# Patient Record
Sex: FEMALE | Race: BLACK OR AFRICAN AMERICAN | NOT HISPANIC OR LATINO | Employment: UNEMPLOYED | URBAN - METROPOLITAN AREA
[De-identification: names, ages, dates, MRNs, and addresses within clinical notes are randomized per-mention and may not be internally consistent; named-entity substitution may affect disease eponyms.]

---

## 2023-09-27 ENCOUNTER — APPOINTMENT (OUTPATIENT)
Dept: CT IMAGING | Facility: HOSPITAL | Age: 53
End: 2023-09-27
Payer: MEDICARE

## 2023-09-27 ENCOUNTER — HOSPITAL ENCOUNTER (EMERGENCY)
Facility: HOSPITAL | Age: 53
Discharge: HOME | End: 2023-09-28
Attending: EMERGENCY MEDICINE
Payer: MEDICARE

## 2023-09-27 ENCOUNTER — HOSPITAL ENCOUNTER (EMERGENCY)
Facility: HOSPITAL | Age: 53
Discharge: HOME | End: 2023-09-27
Attending: EMERGENCY MEDICINE | Admitting: EMERGENCY MEDICINE
Payer: COMMERCIAL

## 2023-09-27 ENCOUNTER — APPOINTMENT (OUTPATIENT)
Dept: GENERAL RADIOLOGY | Facility: HOSPITAL | Age: 53
End: 2023-09-27
Payer: MEDICARE

## 2023-09-27 VITALS
WEIGHT: 185.41 LBS | HEART RATE: 119 BPM | OXYGEN SATURATION: 100 % | RESPIRATION RATE: 31 BRPM | TEMPERATURE: 98.6 F | HEIGHT: 65 IN | BODY MASS INDEX: 30.89 KG/M2 | DIASTOLIC BLOOD PRESSURE: 103 MMHG | SYSTOLIC BLOOD PRESSURE: 169 MMHG

## 2023-09-27 VITALS
WEIGHT: 180.34 LBS | RESPIRATION RATE: 16 BRPM | TEMPERATURE: 98.2 F | OXYGEN SATURATION: 98 % | BODY MASS INDEX: 25.25 KG/M2 | SYSTOLIC BLOOD PRESSURE: 157 MMHG | DIASTOLIC BLOOD PRESSURE: 100 MMHG | HEIGHT: 71 IN | HEART RATE: 118 BPM

## 2023-09-27 DIAGNOSIS — Z00.8 MEDICAL CLEARANCE FOR PSYCHIATRIC ADMISSION: Primary | ICD-10-CM

## 2023-09-27 DIAGNOSIS — R41.0 DISORIENTATION: ICD-10-CM

## 2023-09-27 DIAGNOSIS — S09.90XA INJURY OF HEAD, INITIAL ENCOUNTER: Primary | ICD-10-CM

## 2023-09-27 LAB
ALBUMIN SERPL-MCNC: 4.6 G/DL (ref 3.5–5.2)
ALBUMIN SERPL-MCNC: 5 G/DL (ref 3.5–5.2)
ALBUMIN/GLOB SERPL: 1.5 G/DL
ALBUMIN/GLOB SERPL: 1.5 G/DL
ALP SERPL-CCNC: 105 U/L (ref 39–117)
ALP SERPL-CCNC: 95 U/L (ref 39–117)
ALT SERPL W P-5'-P-CCNC: 11 U/L (ref 1–33)
ALT SERPL W P-5'-P-CCNC: 14 U/L (ref 1–33)
AMPHET+METHAMPHET UR QL: NEGATIVE
ANION GAP SERPL CALCULATED.3IONS-SCNC: 13.2 MMOL/L (ref 5–15)
ANION GAP SERPL CALCULATED.3IONS-SCNC: 16.1 MMOL/L (ref 5–15)
APAP SERPL-MCNC: <5 MCG/ML (ref 0–30)
AST SERPL-CCNC: 18 U/L (ref 1–32)
AST SERPL-CCNC: 26 U/L (ref 1–32)
BARBITURATES UR QL SCN: NEGATIVE
BASOPHILS # BLD AUTO: 0.04 10*3/MM3 (ref 0–0.2)
BASOPHILS # BLD AUTO: 0.04 10*3/MM3 (ref 0–0.2)
BASOPHILS NFR BLD AUTO: 0.3 % (ref 0–1.5)
BASOPHILS NFR BLD AUTO: 0.4 % (ref 0–1.5)
BENZODIAZ UR QL SCN: NEGATIVE
BILIRUB SERPL-MCNC: 1.1 MG/DL (ref 0–1.2)
BILIRUB SERPL-MCNC: 1.1 MG/DL (ref 0–1.2)
BILIRUB UR QL STRIP: NEGATIVE
BUN SERPL-MCNC: 10 MG/DL (ref 6–20)
BUN SERPL-MCNC: 8 MG/DL (ref 6–20)
BUN/CREAT SERPL: 7.8 (ref 7–25)
BUN/CREAT SERPL: 9.3 (ref 7–25)
CALCIUM SPEC-SCNC: 10 MG/DL (ref 8.6–10.5)
CALCIUM SPEC-SCNC: 10.4 MG/DL (ref 8.6–10.5)
CANNABINOIDS SERPL QL: NEGATIVE
CHLORIDE SERPL-SCNC: 100 MMOL/L (ref 98–107)
CHLORIDE SERPL-SCNC: 101 MMOL/L (ref 98–107)
CLARITY UR: CLEAR
CO2 SERPL-SCNC: 20.9 MMOL/L (ref 22–29)
CO2 SERPL-SCNC: 23.8 MMOL/L (ref 22–29)
COCAINE UR QL: NEGATIVE
COLOR UR: YELLOW
CREAT SERPL-MCNC: 1.03 MG/DL (ref 0.57–1)
CREAT SERPL-MCNC: 1.07 MG/DL (ref 0.57–1)
DEPRECATED RDW RBC AUTO: 46.6 FL (ref 37–54)
DEPRECATED RDW RBC AUTO: 48.7 FL (ref 37–54)
EGFRCR SERPLBLD CKD-EPI 2021: 61.9 ML/MIN/1.73
EGFRCR SERPLBLD CKD-EPI 2021: 64.7 ML/MIN/1.73
EOSINOPHIL # BLD AUTO: 0.02 10*3/MM3 (ref 0–0.4)
EOSINOPHIL # BLD AUTO: 0.02 10*3/MM3 (ref 0–0.4)
EOSINOPHIL NFR BLD AUTO: 0.2 % (ref 0.3–6.2)
EOSINOPHIL NFR BLD AUTO: 0.2 % (ref 0.3–6.2)
ERYTHROCYTE [DISTWIDTH] IN BLOOD BY AUTOMATED COUNT: 14.9 % (ref 12.3–15.4)
ERYTHROCYTE [DISTWIDTH] IN BLOOD BY AUTOMATED COUNT: 15 % (ref 12.3–15.4)
ETHANOL BLD-MCNC: <10 MG/DL (ref 0–10)
ETHANOL UR QL: <0.01 %
FENTANYL UR-MCNC: NEGATIVE NG/ML
GLOBULIN UR ELPH-MCNC: 3.1 GM/DL
GLOBULIN UR ELPH-MCNC: 3.3 GM/DL
GLUCOSE SERPL-MCNC: 102 MG/DL (ref 65–99)
GLUCOSE SERPL-MCNC: 103 MG/DL (ref 65–99)
GLUCOSE UR STRIP-MCNC: NEGATIVE MG/DL
HCG INTACT+B SERPL-ACNC: <0.5 MIU/ML
HCT VFR BLD AUTO: 38.1 % (ref 34–46.6)
HCT VFR BLD AUTO: 42.4 % (ref 34–46.6)
HGB BLD-MCNC: 12.1 G/DL (ref 12–15.9)
HGB BLD-MCNC: 13.2 G/DL (ref 12–15.9)
HGB UR QL STRIP.AUTO: NEGATIVE
HOLD SPECIMEN: NORMAL
IMM GRANULOCYTES # BLD AUTO: 0.07 10*3/MM3 (ref 0–0.05)
IMM GRANULOCYTES # BLD AUTO: 0.08 10*3/MM3 (ref 0–0.05)
IMM GRANULOCYTES NFR BLD AUTO: 0.6 % (ref 0–0.5)
IMM GRANULOCYTES NFR BLD AUTO: 0.7 % (ref 0–0.5)
KETONES UR QL STRIP: NEGATIVE
LEUKOCYTE ESTERASE UR QL STRIP.AUTO: NEGATIVE
LIPASE SERPL-CCNC: 16 U/L (ref 13–60)
LYMPHOCYTES # BLD AUTO: 1.87 10*3/MM3 (ref 0.7–3.1)
LYMPHOCYTES # BLD AUTO: 2.01 10*3/MM3 (ref 0.7–3.1)
LYMPHOCYTES NFR BLD AUTO: 15.7 % (ref 19.6–45.3)
LYMPHOCYTES NFR BLD AUTO: 17.5 % (ref 19.6–45.3)
MAGNESIUM SERPL-MCNC: 2.3 MG/DL (ref 1.6–2.6)
MCH RBC QN AUTO: 27.3 PG (ref 26.6–33)
MCH RBC QN AUTO: 27.6 PG (ref 26.6–33)
MCHC RBC AUTO-ENTMCNC: 31.1 G/DL (ref 31.5–35.7)
MCHC RBC AUTO-ENTMCNC: 31.8 G/DL (ref 31.5–35.7)
MCV RBC AUTO: 85.8 FL (ref 79–97)
MCV RBC AUTO: 88.7 FL (ref 79–97)
METHADONE UR QL SCN: NEGATIVE
MONOCYTES # BLD AUTO: 0.79 10*3/MM3 (ref 0.1–0.9)
MONOCYTES # BLD AUTO: 0.83 10*3/MM3 (ref 0.1–0.9)
MONOCYTES NFR BLD AUTO: 6.2 % (ref 5–12)
MONOCYTES NFR BLD AUTO: 7.8 % (ref 5–12)
NEUTROPHILS NFR BLD AUTO: 7.84 10*3/MM3 (ref 1.7–7)
NEUTROPHILS NFR BLD AUTO: 73.4 % (ref 42.7–76)
NEUTROPHILS NFR BLD AUTO: 77 % (ref 42.7–76)
NEUTROPHILS NFR BLD AUTO: 9.87 10*3/MM3 (ref 1.7–7)
NITRITE UR QL STRIP: NEGATIVE
NRBC BLD AUTO-RTO: 0 /100 WBC (ref 0–0.2)
NRBC BLD AUTO-RTO: 0 /100 WBC (ref 0–0.2)
NT-PROBNP SERPL-MCNC: 135.4 PG/ML (ref 0–900)
OPIATES UR QL: NEGATIVE
OXYCODONE UR QL SCN: NEGATIVE
PH UR STRIP.AUTO: 6 [PH] (ref 5–8)
PLATELET # BLD AUTO: 429 10*3/MM3 (ref 140–450)
PLATELET # BLD AUTO: 441 10*3/MM3 (ref 140–450)
PMV BLD AUTO: 9.7 FL (ref 6–12)
PMV BLD AUTO: 9.7 FL (ref 6–12)
POTASSIUM SERPL-SCNC: 3.7 MMOL/L (ref 3.5–5.2)
POTASSIUM SERPL-SCNC: 3.9 MMOL/L (ref 3.5–5.2)
PROT SERPL-MCNC: 7.7 G/DL (ref 6–8.5)
PROT SERPL-MCNC: 8.3 G/DL (ref 6–8.5)
PROT UR QL STRIP: NEGATIVE
RBC # BLD AUTO: 4.44 10*6/MM3 (ref 3.77–5.28)
RBC # BLD AUTO: 4.78 10*6/MM3 (ref 3.77–5.28)
SALICYLATES SERPL-MCNC: <0.3 MG/DL
SODIUM SERPL-SCNC: 137 MMOL/L (ref 136–145)
SODIUM SERPL-SCNC: 138 MMOL/L (ref 136–145)
SP GR UR STRIP: 1.01 (ref 1–1.03)
TROPONIN T SERPL HS-MCNC: 9 NG/L
UROBILINOGEN UR QL STRIP: NORMAL
WBC NRBC COR # BLD: 10.67 10*3/MM3 (ref 3.4–10.8)
WBC NRBC COR # BLD: 12.81 10*3/MM3 (ref 3.4–10.8)
WHOLE BLOOD HOLD COAG: NORMAL
WHOLE BLOOD HOLD COAG: NORMAL
WHOLE BLOOD HOLD SPECIMEN: NORMAL
WHOLE BLOOD HOLD SPECIMEN: NORMAL

## 2023-09-27 PROCEDURE — 80307 DRUG TEST PRSMV CHEM ANLYZR: CPT | Performed by: EMERGENCY MEDICINE

## 2023-09-27 PROCEDURE — 99284 EMERGENCY DEPT VISIT MOD MDM: CPT

## 2023-09-27 PROCEDURE — 84702 CHORIONIC GONADOTROPIN TEST: CPT | Performed by: EMERGENCY MEDICINE

## 2023-09-27 PROCEDURE — 80053 COMPREHEN METABOLIC PANEL: CPT | Performed by: EMERGENCY MEDICINE

## 2023-09-27 PROCEDURE — 85025 COMPLETE CBC W/AUTO DIFF WBC: CPT | Performed by: EMERGENCY MEDICINE

## 2023-09-27 PROCEDURE — 83880 ASSAY OF NATRIURETIC PEPTIDE: CPT | Performed by: EMERGENCY MEDICINE

## 2023-09-27 PROCEDURE — 84484 ASSAY OF TROPONIN QUANT: CPT | Performed by: EMERGENCY MEDICINE

## 2023-09-27 PROCEDURE — 36415 COLL VENOUS BLD VENIPUNCTURE: CPT

## 2023-09-27 PROCEDURE — 93005 ELECTROCARDIOGRAM TRACING: CPT

## 2023-09-27 PROCEDURE — 70450 CT HEAD/BRAIN W/O DYE: CPT

## 2023-09-27 PROCEDURE — 80143 DRUG ASSAY ACETAMINOPHEN: CPT | Performed by: EMERGENCY MEDICINE

## 2023-09-27 PROCEDURE — 93005 ELECTROCARDIOGRAM TRACING: CPT | Performed by: EMERGENCY MEDICINE

## 2023-09-27 PROCEDURE — 80179 DRUG ASSAY SALICYLATE: CPT | Performed by: EMERGENCY MEDICINE

## 2023-09-27 PROCEDURE — 71045 X-RAY EXAM CHEST 1 VIEW: CPT

## 2023-09-27 PROCEDURE — 83690 ASSAY OF LIPASE: CPT | Performed by: EMERGENCY MEDICINE

## 2023-09-27 PROCEDURE — 81003 URINALYSIS AUTO W/O SCOPE: CPT | Performed by: EMERGENCY MEDICINE

## 2023-09-27 PROCEDURE — 93010 ELECTROCARDIOGRAM REPORT: CPT | Performed by: INTERNAL MEDICINE

## 2023-09-27 PROCEDURE — 82077 ASSAY SPEC XCP UR&BREATH IA: CPT | Performed by: EMERGENCY MEDICINE

## 2023-09-27 PROCEDURE — 83735 ASSAY OF MAGNESIUM: CPT | Performed by: EMERGENCY MEDICINE

## 2023-09-27 RX ORDER — CLONAZEPAM 1 MG/1
1 TABLET ORAL DAILY
Status: ON HOLD | COMMUNITY
End: 2023-09-28

## 2023-09-27 RX ORDER — SODIUM CHLORIDE 0.9 % (FLUSH) 0.9 %
10 SYRINGE (ML) INJECTION AS NEEDED
Status: DISCONTINUED | OUTPATIENT
Start: 2023-09-27 | End: 2023-09-27 | Stop reason: HOSPADM

## 2023-09-27 RX ORDER — RISPERIDONE 1 MG/1
1 TABLET ORAL DAILY
Status: ON HOLD | COMMUNITY
End: 2023-09-28

## 2023-09-27 RX ORDER — ASPIRIN 81 MG/1
324 TABLET, CHEWABLE ORAL ONCE
Status: DISCONTINUED | OUTPATIENT
Start: 2023-09-27 | End: 2023-09-27 | Stop reason: HOSPADM

## 2023-09-27 NOTE — ED NOTES
Pt unable to answer any questions at this time. Pt is lethargic, will be here for medical clearance. Will ask Pt triage questions at a later time.
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
## 2023-09-28 ENCOUNTER — HOSPITAL ENCOUNTER (INPATIENT)
Facility: HOSPITAL | Age: 53
LOS: 1 days | Discharge: HOME | DRG: 885 | End: 2023-09-29
Attending: PSYCHIATRY & NEUROLOGY | Admitting: PSYCHIATRY & NEUROLOGY
Payer: MEDICARE

## 2023-09-28 PROBLEM — F29 PSYCHOSIS: Status: ACTIVE | Noted: 2023-09-28

## 2023-09-28 PROBLEM — F39 UNSPECIFIED MOOD (AFFECTIVE) DISORDER: Status: ACTIVE | Noted: 2023-09-28

## 2023-09-28 LAB
QT INTERVAL: 349 MS
QTC INTERVAL: 438 MS

## 2023-09-28 RX ORDER — DIPHENHYDRAMINE HCL 50 MG
50 CAPSULE ORAL EVERY 4 HOURS PRN
Status: DISCONTINUED | OUTPATIENT
Start: 2023-09-28 | End: 2023-09-30 | Stop reason: HOSPADM

## 2023-09-28 RX ORDER — ALUMINA, MAGNESIA, AND SIMETHICONE 2400; 2400; 240 MG/30ML; MG/30ML; MG/30ML
15 SUSPENSION ORAL EVERY 6 HOURS PRN
Status: DISCONTINUED | OUTPATIENT
Start: 2023-09-28 | End: 2023-09-30 | Stop reason: HOSPADM

## 2023-09-28 RX ORDER — LORAZEPAM 2 MG/1
2 TABLET ORAL EVERY 4 HOURS PRN
Status: DISCONTINUED | OUTPATIENT
Start: 2023-09-28 | End: 2023-09-30 | Stop reason: HOSPADM

## 2023-09-28 RX ORDER — HYDROXYZINE PAMOATE 50 MG/1
50 CAPSULE ORAL EVERY 6 HOURS PRN
Status: DISCONTINUED | OUTPATIENT
Start: 2023-09-28 | End: 2023-09-30 | Stop reason: HOSPADM

## 2023-09-28 RX ORDER — LORAZEPAM 2 MG/ML
2 INJECTION INTRAMUSCULAR EVERY 4 HOURS PRN
Status: DISCONTINUED | OUTPATIENT
Start: 2023-09-28 | End: 2023-09-30 | Stop reason: HOSPADM

## 2023-09-28 RX ORDER — HALOPERIDOL 5 MG/ML
5 INJECTION INTRAMUSCULAR EVERY 4 HOURS PRN
Status: DISCONTINUED | OUTPATIENT
Start: 2023-09-28 | End: 2023-09-30 | Stop reason: HOSPADM

## 2023-09-28 RX ORDER — METOPROLOL TARTRATE 50 MG/1
50 TABLET, FILM COATED ORAL ONCE
Status: COMPLETED | OUTPATIENT
Start: 2023-09-28 | End: 2023-09-28

## 2023-09-28 RX ORDER — ACETAMINOPHEN 325 MG/1
650 TABLET ORAL EVERY 4 HOURS PRN
Status: DISCONTINUED | OUTPATIENT
Start: 2023-09-28 | End: 2023-09-30 | Stop reason: HOSPADM

## 2023-09-28 RX ORDER — NICOTINE 21 MG/24HR
1 PATCH, TRANSDERMAL 24 HOURS TRANSDERMAL DAILY PRN
Status: DISCONTINUED | OUTPATIENT
Start: 2023-09-28 | End: 2023-09-30 | Stop reason: HOSPADM

## 2023-09-28 RX ORDER — DIPHENHYDRAMINE HYDROCHLORIDE 50 MG/ML
50 INJECTION INTRAMUSCULAR; INTRAVENOUS EVERY 4 HOURS PRN
Status: DISCONTINUED | OUTPATIENT
Start: 2023-09-28 | End: 2023-09-30 | Stop reason: HOSPADM

## 2023-09-28 RX ORDER — RISPERIDONE 2 MG/1
2 TABLET ORAL 2 TIMES DAILY
Status: DISCONTINUED | OUTPATIENT
Start: 2023-09-28 | End: 2023-09-29

## 2023-09-28 RX ORDER — TRAZODONE HYDROCHLORIDE 50 MG/1
100 TABLET ORAL NIGHTLY PRN
Status: DISCONTINUED | OUTPATIENT
Start: 2023-09-28 | End: 2023-09-30 | Stop reason: HOSPADM

## 2023-09-28 RX ORDER — LOPERAMIDE HYDROCHLORIDE 2 MG/1
2 CAPSULE ORAL
Status: DISCONTINUED | OUTPATIENT
Start: 2023-09-28 | End: 2023-09-30 | Stop reason: HOSPADM

## 2023-09-28 RX ORDER — HALOPERIDOL 5 MG/1
5 TABLET ORAL EVERY 4 HOURS PRN
Status: DISCONTINUED | OUTPATIENT
Start: 2023-09-28 | End: 2023-09-30 | Stop reason: HOSPADM

## 2023-09-28 RX ADMIN — RISPERIDONE 2 MG: 2 TABLET, FILM COATED ORAL at 11:57

## 2023-09-28 RX ADMIN — METOPROLOL TARTRATE 50 MG: 50 TABLET, FILM COATED ORAL at 09:21

## 2023-09-28 RX ADMIN — RISPERIDONE 2 MG: 2 TABLET, FILM COATED ORAL at 20:55

## 2023-09-28 NOTE — PLAN OF CARE
Goal Outcome Evaluation:  Plan of Care Reviewed With: patient  Patient Agreement with Plan of Care: agrees with comment (describe) (Pt is highly confused, understanding of information is questioned.)   See admit note
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
## 2023-09-28 NOTE — PLAN OF CARE
Goal Outcome Evaluation:  Plan of Care Reviewed With: patient  Patient Agreement with Plan of Care: disagrees (describe) (pt requesting discharge, med compliant with encouragement)            Pt has been withdrawn to room resting. She has exhibited some paranoid thinking regarding medication, but has been med compliant. She is easy to startle.  She denies SI/HI, AVH and contracts for safety.  She is somewhat confused, and oriented to self, and speaks about trying to get back to work. She continually asks to leave.  Will continue to monitor.
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
## 2023-09-29 VITALS
SYSTOLIC BLOOD PRESSURE: 145 MMHG | RESPIRATION RATE: 18 BRPM | DIASTOLIC BLOOD PRESSURE: 90 MMHG | HEIGHT: 66 IN | TEMPERATURE: 98.2 F | OXYGEN SATURATION: 100 % | WEIGHT: 182.98 LBS | HEART RATE: 69 BPM | BODY MASS INDEX: 29.41 KG/M2

## 2023-09-29 PROBLEM — I10 PRIMARY HYPERTENSION: Status: ACTIVE | Noted: 2023-09-29

## 2023-09-29 LAB
ALBUMIN SERPL-MCNC: 4.8 G/DL (ref 3.5–5.2)
ALBUMIN/GLOB SERPL: 1.6 G/DL
ALP SERPL-CCNC: 109 U/L (ref 39–117)
ALT SERPL W P-5'-P-CCNC: 17 U/L (ref 1–33)
ANION GAP SERPL CALCULATED.3IONS-SCNC: 11.5 MMOL/L (ref 5–15)
AST SERPL-CCNC: 25 U/L (ref 1–32)
BILIRUB SERPL-MCNC: 0.7 MG/DL (ref 0–1.2)
BUN SERPL-MCNC: 7 MG/DL (ref 6–20)
BUN/CREAT SERPL: 6.8 (ref 7–25)
CALCIUM SPEC-SCNC: 10.1 MG/DL (ref 8.6–10.5)
CHLORIDE SERPL-SCNC: 99 MMOL/L (ref 98–107)
CO2 SERPL-SCNC: 27.5 MMOL/L (ref 22–29)
CREAT SERPL-MCNC: 1.03 MG/DL (ref 0.57–1)
EGFRCR SERPLBLD CKD-EPI 2021: 64.7 ML/MIN/1.73
GLOBULIN UR ELPH-MCNC: 3 GM/DL
GLUCOSE SERPL-MCNC: 101 MG/DL (ref 65–99)
POTASSIUM SERPL-SCNC: 4 MMOL/L (ref 3.5–5.2)
PROT SERPL-MCNC: 7.8 G/DL (ref 6–8.5)
SODIUM SERPL-SCNC: 138 MMOL/L (ref 136–145)

## 2023-09-29 PROCEDURE — 80053 COMPREHEN METABOLIC PANEL: CPT | Performed by: PSYCHIATRY & NEUROLOGY

## 2023-09-29 RX ORDER — RISPERIDONE 3 MG/1
3 TABLET ORAL 2 TIMES DAILY
Status: DISCONTINUED | OUTPATIENT
Start: 2023-09-29 | End: 2023-09-30 | Stop reason: HOSPADM

## 2023-09-29 RX ORDER — METOPROLOL SUCCINATE 25 MG/1
25 TABLET, EXTENDED RELEASE ORAL
Qty: 30 TABLET | Refills: 0 | Status: SHIPPED | OUTPATIENT
Start: 2023-09-30

## 2023-09-29 RX ORDER — RISPERIDONE 3 MG/1
3 TABLET ORAL 2 TIMES DAILY
Qty: 60 TABLET | Refills: 1 | Status: SHIPPED | OUTPATIENT
Start: 2023-09-29

## 2023-09-29 RX ORDER — METOPROLOL SUCCINATE 25 MG/1
25 TABLET, EXTENDED RELEASE ORAL
Status: DISCONTINUED | OUTPATIENT
Start: 2023-09-29 | End: 2023-09-30 | Stop reason: HOSPADM

## 2023-09-29 RX ADMIN — RISPERIDONE 3 MG: 3 TABLET ORAL at 20:22

## 2023-09-29 RX ADMIN — RISPERIDONE 2 MG: 2 TABLET, FILM COATED ORAL at 08:42

## 2023-09-29 RX ADMIN — METOPROLOL SUCCINATE 25 MG: 25 TABLET, EXTENDED RELEASE ORAL at 11:20

## 2023-09-29 RX ADMIN — ACETAMINOPHEN 650 MG: 325 TABLET ORAL at 03:59

## 2023-09-29 NOTE — PROGRESS NOTES
[facility]     Psychiatric Progress Note    Patient Name: José Luis Burch  : 1970  MRN: [id number]  Primary Care Physician:  Provider, No Known  Date of admission: 2023    Subjective   Subjective     Patient seen and chart reviewed, discussed with staff.    Chief Complaint:       HPI:     Staff reports that she has had significant elevated blood pressures.  Rated anxiety as a 5 and depression as a 7.  Staff did talk to a friend of hers as well as her father and provided some history.  She has a previous history of psychiatric disorders but they are unsure of potential diagnosis.  He reported his behavior is out of the ordinary for her.  Patient appears to be less confused to staff.    Patient today is cooperative engaging.  Patient reports that she slept okay last night.  She reports her mood is okay.  Patient continues to ask about going home.  Patient continues to be somewhat disorganized and unable to provide history of how she got here.  She is tangential and she is also attempting to cover up for cognitive issues.  She is somewhat rambling at times.  Her speech may be slightly pushed but it is not pressured.  She has no acute agitation or restlessness.    Spent a long time with the patient asking about symptoms of bipolar disorder.  She reports having periods of little sleep with increased energy, but denies any excessive spending.  Denies any periods of impulsivity.  She denies impulsivity but family reports that she had a hospitalization of 6 to 7 days in [place] recently and that was very similar to this 1.  She apparently left [place] for unknown reason and was found disorganized and bizarre in another state.  Patient cannot tell me why she would have left [place] to come to [facility].  She is fully oriented and knows the day, month, year, place, city, and state and urine.  Patient cannot seem to keep her mind we have talked with family and that they are going to come pick  "her up.  She keeps talking by getting money orders and writing cars.    She is denying suicidal ideations but her recent behavior as well as her current thought disorganization reveal a degree of dangerousness that warrants continued mood stabilization.  Patient cannot safely navigate bus system to get back home.  Also would not trust her driving her car and getting back home safely.  She may well intend to do that but her tangentiality and thought disorganization would interfere.    Patient has support of friends and father are willing to come pick her up.  We will need to work out the logistics of this.  However she needs further stabilization prior to discharge.      Objective   Objective     Vitals:   Temp:  [98.3 °F (36.8 °C)] 98.3 °F (36.8 °C)  Heart Rate:  [115-119] 118  Resp:  [16-18] 16  BP: (136-156)/() 151/104    Increase blood pressure and will add metoprolol.      Mental Status Exam:      Appearance:   Well-developed, well-nourished, participates in interview, has some anxiety  Reliability:   Limited  Eye Contact:   Limited  Concentration/Focus:    Easily distracted and derailed  Behaviors:    No agitation  Memory :    Sensorium grossly intact, but unable to provide details of how she got here and has deficits  Speech:    Rambling, normal rate and volume  Language:   Appropriate, relevant  Mood :    \"It is okay\"  Affect:    Blunted  Thought process:    Tangential, disorganized, perseverative at times  Thought Content:    Denies suicidal or homicidal ideation, denies hallucinations and none evident but is inattentive and may be responding to internal stimuli that is not obvious  Insight:   Poor  Judgement:    Impaired      Result Review    Result Review:  I have personally reviewed the results from the time of this admission to 9/29/2023 12:31 EDT and agree with these findings:  []  Laboratory  []  Microbiology  []  Radiology  []  EKG/Telemetry   []  Cardiology/Vascular   []  Pathology  []  Old " records  []  Other:  Most notable findings include:     Lab Results (last 24 hours)       ** No results found for the last 24 hours. **                Medications:   metoprolol succinate XL, 25 mg, Oral, Q24H  risperiDONE, 3 mg, Oral, BID          Assessment / Plan       Active Hospital Problems:  Active Hospital Problems    Diagnosis     Primary hypertension     Psychosis        Plan:     Increase Risperdal to 3 mg twice daily  Considered adding lithium but she has an increased creatinine and GFR is within reference range but on the low end.  Will repeat BMP in the morning to see if it is improved and if lithium may be a choice for her  Add metoprolol for hypertension, consider hospitalist consult if not better on metoprolol  Work on mood stabilization and abatement of any suicidal ideation or psychosis.  Work on appropriate safety plan  Continue supportive therapy  Patient to engage in all group and individual treatment modalities available on the unit  Obtain collateral information if possible  Titrate medications as clinically indicated  Work on appropriate disposition follow-up including referrals to substance abuse treatment if indicated      Disposition:  I expect patient to be discharged to 3 days with family.    Part of this note may be an electronic transcription/translation of spoken language to printed text using the Dragon dictation system.         Electronically signed by Gabe Mcclure MD, 09/29/23, 12:31 PM EDT.

## 2023-09-29 NOTE — PLAN OF CARE
Goal Outcome Evaluation:  Plan of Care Reviewed With: patient  Patient Agreement with Plan of Care: agrees   Pt unable to rate anxiety and depression. Pt denied AVH, SI, HI. Pt sedentary this shift, stayed in bed resting unless up for medication or snack. Pt still disoriented to situation. Pauses during conversation, needs short simple questions or directions.   Pt up during the night, came out of room holding onto the wall for balance, confused over situation, place and time. Pt reoriented and assisted back to bed. Up another time for headache and medication administration.

## 2023-09-29 NOTE — SIGNIFICANT NOTE
09/29/23 1524   Plan   Patient/Family in Agreement with Plan yes   Plan Comments Phone call with pt and her father. Father agrees to pick pt up today at 10pm and return to [place]. Pt and father aware pt will have medications on her person. Father in the middle of bus route and advised he will call back to get address.   Final Discharge Disposition Code 01 - home or self-care   Final Note Pt discharging home to [place] under the care of her father. Treatment Hotline number provided for pt to follow up with outpatient. Pt will follow up with PCP.

## 2023-09-29 NOTE — PLAN OF CARE
Goal Outcome Evaluation:  Plan of Care Reviewed With: patient  Patient Agreement with Plan of Care: agrees     Progress: improving    Patient was awake and alert, clear speech, intermittent eye contact, contracts for safety, able to make needs known, pt denies SI/HI and denies A/V/H, pt rates anxiety 5/10, depression 7/10, pt took meds for BP, see eMAR. Pt has been in dayroom some but withdrawn to room during most of shift, pt is bring discharge home and father is coming to get her at 10pm

## 2023-09-29 NOTE — DISCHARGE SUMMARY
[facility]         DISCHARGE SUMMARY    Patient Name: José Luis Burch  : 1970  MRN: [id number]    Date of Admission: 2023  Date of Discharge: 2023  Primary Care Physician: Provider, No Known    Consults       No orders found for last 30 day(s).            Presenting Problem:   Unspecified mood (affective) disorder [F39]    Active and Resolved Hospital Problems:  Active Hospital Problems    Diagnosis POA    Primary hypertension [I10] Yes    Psychosis [F29] Yes      Resolved Hospital Problems   No resolved problems to display.         Hospital Course     Hospital Course:  José Luis Burch is a 53 y.o. female with a history of hypertension and reported depression and bipolar disorder that was admitted on a mental Inquest warrant.  Patient was found to be wandering confused by State police on numerous occasions on the night of discharge.  Patient was evaluated emergency room and found to have no acute medical issues that could explain her confusion.  Her level of confusion was not so bad as to warrant hospitalization at time of evaluation, and she had no behavioral disturbance, agitation, or suicidal ideation.  Patient was insistent on discharge I believe in the emergency room.    The patient is initially very difficult to engage and superficial.  She is calm and cooperative the exam.  She sat quietly throughout the entirety of the evaluation.  However at times her answers were very superficial and she was guarded.  The patient could not give a clear and concise history of why she was in the hospital or how she came to be at the hospital.  She kept insisting that she wanted to go home and that there was nothing wrong with her.  Patient was unaware of her surroundings and had to be pulled back away from traffic on [address] by State police.  State police issued a citation on whether her account is with her and had her evaluated by mental Inquest warrant office and supposedly upheld the  patient hospitalized.    She was hypertensive during her stay and had some tachycardia metoprolol was effective.  Metoprolol was started for routine management of blood pressure was elevated.  Patient will need to follow-up with her primary care provider for this.  She tells me that she has been treated for hypertension in the past but does not recall the medications.    She has a history of being on psychiatric medications in the past per chart review including antipsychotics, mood stabilizers, and antidepressants.  She cannot give me a current list of medications which tell me why she was on these medications.  She was started on Risperdal 2 mg twice daily for her psychosis.  She continued to have some thought disorganization but was somewhat improved on day 2.  She did not have any agitation did not require any medicines for acute agitation did not require any medicines for sleep.  This was increased to 3 mg twice daily for further mood stabilization.    Patient was quite insistent on leaving the hospital.  She is commands on the end area.  Patient felt not to be safe cannot be trusted to safely get home if she were given a bus ticket, as she was requesting, ordering a car.  Do not believe that she will purposely not go to [place] but concerned her mental status would interfere and she would wind up somewhere else.  She was fully oriented.  Staff was able to get a hold of the patient's father as well and was a close friend of hers.  They report that she has had some episodes in the past of psychiatric issues.  They report that she had a very similar incident a number of weeks ago and was hospitalized for 6 days in [place].  Patient cannot tell me the details of that day and states that she is unaware of that occurring and not sure if she truly forgets or is not wanting to admit that that occurred.    The patient's family talk to her on the telephone.  The patient's family are concerned about her wellbeing and  are supportive.  Family were hoping to pick her up this evening and take her to [place] where they could seek further treatment as necessary.  [place] being at home with her support system and with her regular treating physicians that would be a more optimal setting for receiving care.  Patient is been free of any thoughts of harming herself or others.  She has been calm and cooperative.  She has been compliant with medications.  Family would like to take the patient home today and we will discharge.        DISCHARGE Follow Up Recommendations for labs and diagnostics: Routine labs from primary care, renal function, treatment for hypertension, outpatient psychiatric care with consideration for intensive outpatient or partial hospitalization program      Day of Discharge     Vital Signs:  Temp:  [98.3 °F (36.8 °C)] 98.3 °F (36.8 °C)  Heart Rate:  [118-119] 118  Resp:  [16-18] 16  BP: (151-156)/() 151/104      Pertinent  and/or Most Recent Results     LAB RESULTS:      Lab 09/27/23 1945 09/27/23  1021   WBC 10.67 12.81*   HEMOGLOBIN 12.1 13.2   HEMATOCRIT 38.1 42.4   PLATELETS 441 429   NEUTROS ABS 7.84* 9.87*   IMMATURE GRANS (ABS) 0.07* 0.08*   LYMPHS ABS 1.87 2.01   MONOS ABS 0.83 0.79   EOS ABS 0.02 0.02   MCV 85.8 88.7         Lab 09/29/23 1329 09/27/23 1945 09/27/23  1021   SODIUM 138 138 137   POTASSIUM 4.0 3.9 3.7   CHLORIDE 99 101 100   CO2 27.5 20.9* 23.8   ANION GAP 11.5 16.1* 13.2   BUN 7 10 8   CREATININE 1.03* 1.07* 1.03*   EGFR 64.7 61.9 64.7   GLUCOSE 101* 103* 102*   CALCIUM 10.1 10.0 10.4   MAGNESIUM  --   --  2.3         Lab 09/29/23 1329 09/27/23 1945 09/27/23  1021   TOTAL PROTEIN 7.8 7.7 8.3   ALBUMIN 4.8 4.6 5.0   GLOBULIN 3.0 3.1 3.3   ALT (SGPT) 17 14 11   AST (SGOT) 25 26 18   BILIRUBIN 0.7 1.1 1.1   ALK PHOS 109 95 105   LIPASE  --   --  16         Lab 09/27/23  1021   PROBNP 135.4   HSTROP T 9                                 Lab 09/27/23  1945   ETHANOL PCT <0.010   ETHANOL MGDL  <10         Lab 09/27/23  2122   AMPH/METHAM SCREEN, URINE Negative   BENZODIAZEPINE SCREEN, URINE Negative   COCAINE SCREEN, URINE Negative   OPIATES Negative   THC URINE SCREEN Negative   METHADONE SCREEN, URINE Negative     Brief Urine Lab Results  (Last result in the past 365 days)        Color   Clarity   Blood   Leuk Est   Nitrite   Protein   CREAT   Urine HCG        09/27/23 1011 Yellow   Clear   Negative   Negative   Negative   Negative                      CT Head Without Contrast    Result Date: 9/27/2023  Impression:  No acute brain abnormality is seen. Specifically, no acute intracranial hemorrhage, no acute infarct, no intracranial mass lesion, and no acute intracranial mass effect are appreciated.   Please note that portions of this note were completed with a voice recognition program.  SHIMON ROGERS JR, MD       Electronically Signed and Approved By: SHIMON ROGERS JR, MD on 9/27/2023 at 23:19              CT Head Without Contrast    Result Date: 9/27/2023  Impression:  1. No CT findings of acute intracranial abnormality. 2. Frontal sinus mucosal thickening.     JOSE AMIN MD       Electronically Signed and Approved By: JOSE AMIN MD on 9/27/2023 at 9:01             XR Chest 1 View    Result Date: 9/27/2023  Impression:  No acute cardiopulmonary process identified.       ZARIA LEIGH MD       Electronically Signed and Approved By: ZARIA LEIGH MD on 9/27/2023 at 8:29                          Imaging Results (Last 7 Days)       ** No results found for the last 168 hours. **             Labs Pending at Discharge:           Discharge Details        Discharge Medications        New Medications        Instructions Start Date   metoprolol succinate XL 25 MG 24 hr tablet  Commonly known as: TOPROL-XL   25 mg, Oral, Every 24 Hours Scheduled   Start Date: September 30, 2023     risperiDONE 3 MG tablet  Commonly known as: risperDAL   3 mg, Oral, 2 Times Daily               No Known  Allergies      Discharge Disposition:  Home or Self Care    Diet:  Hospital:  Diet Order   Procedures    Diet: Regular/House Diet; Texture: Regular Texture (IDDSI 7); Fluid Consistency: Thin (IDDSI 0)         Discharge Activity: Ad reina.  Activity Instructions       Activity as Tolerated              Discharge Condition: Fair    CODE STATUS:  Code Status and Medical Interventions:   Ordered at: 09/28/23 0413     Code Status (Patient has no pulse and is not breathing):    CPR (Attempt to Resuscitate)     Medical Interventions (Patient has pulse or is breathing):    Full Support         No future appointments.    Additional Instructions for the Follow-ups that You Need to Schedule       Discharge Follow-up with PCP   As directed       Currently Documented PCP:    Provider, No Known    PCP Phone Number:    None     Follow Up Details: as scheduled        Discharge Follow-up with Specified Provider: Outpatient psych in [place], [place].   As directed      To: Outpatient psych in [place], [place].                Time spent on Discharge including face to face service:  30 minutes    Part of this note may be an electronic transcription/translation of spoken language to printed text using the Dragon dictation system.        Electronically signed by Gabe Mcclure MD, 09/29/23, 3:36 PM EDT.

## 2023-09-30 NOTE — NURSING NOTE
Spoke with the patient's father, Mr. Burch,  at 2030 and he states that he is on his way to  his daughter and he advises that he will call when he is approximately 30 minutes away.

## 2023-09-30 NOTE — PLAN OF CARE
Goal Outcome Evaluation:    Pt is alert but confused about where she is. Pt is calm and cooperative with staff and medication compliant. Pt took a shower this evening in preparation to be discharged tonight. This RN spoke with the patient's father and he is on the way to pick the patient up. Pt is unable to rate her depression and anxiety, but states that she has some anxiety related to discharge. Pt denies SI, HI or AVH. Care of this patient is ongoing. -- AS RN